# Patient Record
Sex: FEMALE | Race: WHITE | NOT HISPANIC OR LATINO | URBAN - METROPOLITAN AREA
[De-identification: names, ages, dates, MRNs, and addresses within clinical notes are randomized per-mention and may not be internally consistent; named-entity substitution may affect disease eponyms.]

---

## 2017-02-24 ENCOUNTER — OFFICE VISIT - HEALTHEAST (OUTPATIENT)
Dept: FAMILY MEDICINE | Facility: CLINIC | Age: 32
End: 2017-02-24

## 2017-02-24 DIAGNOSIS — R05.9 COUGH: ICD-10-CM

## 2017-02-24 ASSESSMENT — MIFFLIN-ST. JEOR: SCORE: 1897.89

## 2017-03-27 ENCOUNTER — COMMUNICATION - HEALTHEAST (OUTPATIENT)
Dept: SCHEDULING | Facility: CLINIC | Age: 32
End: 2017-03-27

## 2017-10-05 ENCOUNTER — AMBULATORY - HEALTHEAST (OUTPATIENT)
Dept: NURSING | Facility: CLINIC | Age: 32
End: 2017-10-05

## 2017-10-05 DIAGNOSIS — Z23 NEED FOR IMMUNIZATION AGAINST INFLUENZA: ICD-10-CM

## 2018-01-25 ENCOUNTER — OFFICE VISIT - HEALTHEAST (OUTPATIENT)
Dept: FAMILY MEDICINE | Facility: CLINIC | Age: 33
End: 2018-01-25

## 2018-01-25 ENCOUNTER — HOSPITAL ENCOUNTER (OUTPATIENT)
Dept: CT IMAGING | Facility: CLINIC | Age: 33
Discharge: HOME | End: 2018-01-25
Attending: FAMILY MEDICINE

## 2018-01-25 ENCOUNTER — COMMUNICATION - HEALTHEAST (OUTPATIENT)
Dept: TELEHEALTH | Facility: CLINIC | Age: 33
End: 2018-01-25

## 2018-01-25 ENCOUNTER — COMMUNICATION - HEALTHEAST (OUTPATIENT)
Dept: FAMILY MEDICINE | Facility: CLINIC | Age: 33
End: 2018-01-25

## 2018-01-25 DIAGNOSIS — R10.9 ABDOMINAL PAIN: ICD-10-CM

## 2018-01-25 LAB
ALBUMIN UR-MCNC: NEGATIVE MG/DL
APPEARANCE UR: CLEAR
BACTERIA #/AREA URNS HPF: ABNORMAL HPF
BILIRUB UR QL STRIP: NEGATIVE
COLOR UR AUTO: YELLOW
GLUCOSE UR STRIP-MCNC: NEGATIVE MG/DL
HCG UR QL: NEGATIVE
HGB UR QL STRIP: NEGATIVE
KETONES UR STRIP-MCNC: NEGATIVE MG/DL
LEUKOCYTE ESTERASE UR QL STRIP: ABNORMAL
NITRATE UR QL: NEGATIVE
PH UR STRIP: 6 [PH] (ref 5–8)
RBC #/AREA URNS AUTO: ABNORMAL HPF
SP GR UR STRIP: 1.02 (ref 1–1.03)
SP GR UR STRIP: 1.02 (ref 1–1.03)
SQUAMOUS #/AREA URNS AUTO: ABNORMAL LPF
UROBILINOGEN UR STRIP-ACNC: ABNORMAL
WBC #/AREA URNS AUTO: ABNORMAL HPF

## 2018-07-05 ENCOUNTER — RECORDS - HEALTHEAST (OUTPATIENT)
Dept: LAB | Facility: CLINIC | Age: 33
End: 2018-07-05

## 2018-07-07 LAB — BACTERIA SPEC CULT: NORMAL

## 2018-07-11 ENCOUNTER — COMMUNICATION - HEALTHEAST (OUTPATIENT)
Dept: ADMINISTRATIVE | Facility: CLINIC | Age: 33
End: 2018-07-11

## 2018-07-11 ENCOUNTER — OFFICE VISIT - HEALTHEAST (OUTPATIENT)
Dept: FAMILY MEDICINE | Facility: CLINIC | Age: 33
End: 2018-07-11

## 2018-07-11 DIAGNOSIS — H72.92 TYMPANIC MEMBRANE PERFORATION, LEFT: ICD-10-CM

## 2018-07-17 ENCOUNTER — OFFICE VISIT - HEALTHEAST (OUTPATIENT)
Dept: FAMILY MEDICINE | Facility: CLINIC | Age: 33
End: 2018-07-17

## 2018-07-17 DIAGNOSIS — H72.90 PERFORATED TYMPANIC MEMBRANE: ICD-10-CM

## 2018-10-03 ENCOUNTER — AMBULATORY - HEALTHEAST (OUTPATIENT)
Dept: NURSING | Facility: CLINIC | Age: 33
End: 2018-10-03

## 2018-11-30 ENCOUNTER — RECORDS - HEALTHEAST (OUTPATIENT)
Dept: ADMINISTRATIVE | Facility: OTHER | Age: 33
End: 2018-11-30

## 2019-01-18 ASSESSMENT — MIFFLIN-ST. JEOR: SCORE: 1831.2

## 2019-01-21 ENCOUNTER — ANESTHESIA - HEALTHEAST (OUTPATIENT)
Dept: SURGERY | Facility: HOSPITAL | Age: 34
End: 2019-01-21

## 2019-01-22 ENCOUNTER — SURGERY - HEALTHEAST (OUTPATIENT)
Dept: SURGERY | Facility: HOSPITAL | Age: 34
End: 2019-01-22

## 2020-02-03 ENCOUNTER — RECORDS - HEALTHEAST (OUTPATIENT)
Dept: ADMINISTRATIVE | Facility: OTHER | Age: 35
End: 2020-02-03

## 2021-01-24 ENCOUNTER — RECORDS - HEALTHEAST (OUTPATIENT)
Dept: ADMINISTRATIVE | Facility: OTHER | Age: 36
End: 2021-01-24

## 2021-02-11 ENCOUNTER — RECORDS - HEALTHEAST (OUTPATIENT)
Dept: ADMINISTRATIVE | Facility: OTHER | Age: 36
End: 2021-02-11

## 2021-05-30 VITALS — WEIGHT: 253.2 LBS | HEIGHT: 69 IN | BODY MASS INDEX: 37.5 KG/M2

## 2021-05-31 VITALS — BODY MASS INDEX: 37.55 KG/M2 | WEIGHT: 254.25 LBS

## 2021-06-01 VITALS — WEIGHT: 250.3 LBS | BODY MASS INDEX: 36.96 KG/M2

## 2021-06-01 VITALS — BODY MASS INDEX: 36.93 KG/M2 | WEIGHT: 250.06 LBS

## 2021-06-02 ENCOUNTER — RECORDS - HEALTHEAST (OUTPATIENT)
Dept: ADMINISTRATIVE | Facility: CLINIC | Age: 36
End: 2021-06-02

## 2021-06-02 VITALS — HEIGHT: 70 IN | WEIGHT: 235 LBS | BODY MASS INDEX: 33.64 KG/M2

## 2021-06-09 NOTE — PROGRESS NOTES
ASSESSMENT & PLAN:  1. Cough    To hold off on amoxicillin.  We'll switch her to Z-Robert.  Discussed compliance with medication.  Continue with the benzonatate, pro-air.  Discussed indications.  Recheck with PCP in one week if persistent or worse.  Fluid hydration, precautionary measures.  She was agreeable with the plans.       CHIEF COMPLAINT:  Chief Complaint   Patient presents with     Cough     First week in Feb. had pink eye ([facility]), next week seen online and diagnosed with a sinus infection and prescribed Amoxicllin 500 mg x7 days (only took for 2 days, due to stomach pain) and Benzonatate 100 mg PRN, pt now restarted Amoxcillin x2 days and symptoms getting worse.         HISTORY OF PRESENT ILLNESS:  Aspen is a 32 y.o. female presenting to the clinic today for evaluation of her cough and body aches. She is a patient of Dr. Coates. Her kids have had sinus infections, and she was prescribed amoxicillin for a sinus infection on [facility] a couple of weeks ago. She has not been using the benzonatate she was prescribed then because it was not very effective. She stopped taking amoxicillin after due days because it caused nausea and diarrhea. She was feeling better overall after she stopped the amoxicillin.   She then went to the [place] and she was feeling well until the last day of that week; she was coughing a lot at that time and it has been getting worse. She is coughing up phlegm and it has a yellow tint. She cannot speak in the mornings because her voice is gone. She started taking her amoxicillin two days ago, three times per day; she is not having stomach issues or diarrhea, but she is getting worse despite the antibiotic. She was not exposed to any specific known illness at [place], but she is sure she could have been. She had some chills, body aches, and she has been getting short of breath walking up a flight of stairs which is new for her. She has been taking ibuprofen for her pain  "and using an albuterol inhaler for wheezing and cough. The albuterol has been helpful for her wheezing but not for her cough. She has been trying to keep herself well-hydrated. She was wheezing and coughing last night and it would wake her up from sleep; she notes benzonatate and albuterol were helpful. She used the inhaler this morning before coming here. She has been taking an OTC probiotic. She denies any concerns for pregnancy; she had PCOS.     Recent Conjunctivitis: She was diagnosed with pink eye at the [facility] in the first week of February; she was prescribed eye drops and her symptoms cleared up.     REVIEW OF SYSTEMS:   She denies any fever, hemoptysis, nausea, vomiting, or history of lung disease or smoking. She has not taken any recent international trips. All other systems are negative.     PFSH:     TOBACCO USE:  History   Smoking Status     Never Smoker   Smokeless Tobacco     Never Used        VITALS:  Vitals:    02/24/17 1135   BP: 110/70   Patient Site: Left Arm   Patient Position: Sitting   Cuff Size: Adult Large   Pulse: 99   Temp: 98  F (36.7  C)   TempSrc: Oral   SpO2: 98%   Weight: (!) 253 lb 3.2 oz (114.9 kg)   Height: 5' 9\" (1.753 m)     Wt Readings from Last 3 Encounters:   02/24/17 (!) 253 lb 3.2 oz (114.9 kg)   11/05/16 (!) 259 lb 6.4 oz (117.7 kg)   11/01/16 (!) 256 lb 1.6 oz (116.2 kg)     Body mass index is 37.39 kg/(m^2).     PHYSICAL EXAM:  Visit Vitals     /70 (Patient Site: Left Arm, Patient Position: Sitting, Cuff Size: Adult Large)     Pulse 99     Temp 98  F (36.7  C) (Oral)  Comment: OTC Ibuprofen this morning.     Ht 5' 9\" (1.753 m)     Wt (!) 253 lb 3.2 oz (114.9 kg)     LMP 01/01/2017 (Approximate)  Comment: PCOS     SpO2 98%     Breastfeeding No     BMI 37.39 kg/m2       Vital signs noted above. AAO ×3.  HEENT negative.  Neck: Supple neck, nonpalpable cervical lymph nodes. Lungs: Clear to auscultation bilateral.  Heart: S1-S2 regular rate and rhythm, no murmurs " were noted.  Abdomen: with bowel sounds.  Extremities: pulses were full and equal.    DATA REVIEWED:  ADDITIONAL HISTORY SUMMARIZED (2): None.   DECISION TO OBTAIN EXTRA INFORMATION (1): GABO for [facility].   RADIOLOGY TESTS (1): None.  LABS (1): None.  MEDICINE TESTS (1): None.  INDEPENDENT REVIEW (2 each): None.     The visit lasted a total of 11 minutes face to face with the patient. Over 50% of the time was spent counseling and educating the patient about her cough and sinusitis.     IHilary, am scribing for and in the presence of Dr. Gibson.  IDr. Gibson, personally performed the services described in this documentation, as scribed by Hilary Ovalle in my presence, and it is both accurate and complete.     MEDICATIONS:  Current Outpatient Prescriptions   Medication Sig Dispense Refill     benzonatate (TESSALON PERLES) 100 MG capsule Take 1 capsule (100 mg total) by mouth 3 (three) times a day as needed for cough. 20 capsule 0     clonazePAM (KLONOPIN) 1 MG tablet Take one twice daily       escitalopram oxalate (LEXAPRO) 20 MG tablet TAKE 1 TABLET DAILY.  5     fluticasone (FLONASE) 50 mcg/actuation nasal spray 2 sprays into each nostril daily. 16 g 0     hydrOXYzine (ATARAX) 25 MG tablet as needed.       VENTOLIN HFA 90 mcg/actuation inhaler        azithromycin (ZITHROMAX Z-BHARGAVI) 250 MG tablet Take 2 tablets (500 mg) on  Day 1,  followed by 1 tablet (250 mg) once daily on Days 2 through 5. 6 tablet 0     escitalopram oxalate (LEXAPRO) 10 MG tablet Take one daily       No current facility-administered medications for this visit.         Total data points: 1

## 2021-06-15 NOTE — PROGRESS NOTES
ASSESSMENT/PLAN:  Abdominal pain  32-year-old female presents with a few days of left lower quadrant abdominal pain.  Differential diagnoses include diverticulitis versus ovarian.  Will obtain CT abdomen and pelvis.  We will get a urinalysis as well.  Further management will depend on results and her symptoms.  The patient verbalized understanding and agree with the plan  -     Urinalysis  -     CT Abdomen Pelvis Without Oral With Without IV Contrast; Future; Expected date: 1/25/18  -     Pregnancy (Beta-hCG, Qual), Urine    Orders Placed This Encounter   Procedures     CT Abdomen Pelvis Without Oral With Without IV Contrast     Standing Status:   Future     Standing Expiration Date:   1/25/2019     Order Specific Question:   Is the patient pregnant?     Answer:   No     Order Specific Question:   Can the procedure be changed per Radiologist protocol?     Answer:   Yes     Urinalysis     Pregnancy (Beta-hCG, Qual), Urine           CHIEF COMPLAINT:  Chief Complaint   Patient presents with     Abdominal Pain     x few days       HISTORY OF PRESENT ILLNESS:  Aspen is a 32 y.o. female presenting to the clinic today for abdominal pain. She has felt some lower abdominal pain for the past 4 days or so. She first felt the pain while walking around. She has felt the pain now more on the left lower abdomen. The pain has been intermittent, but she feels better when she lies down. The pain is rated at a 5-6/10, and a 7/10 with pressure on the area. She has felt similar pain in the past, but it has not lasted this long or been this intense. Her last period was 2 weeks ago. She did take a home pregnancy test, and it was negative. No abnormal bleeding. She is having some more discharge than normal. No vaginal itchiness or odor. She does note that her urine had more of an odor than usual. Bowel movements are more formed than normal; she just started a probiotic.     REVIEW OF SYSTEMS:   Constitutional: Negative.   HENT:  Negative.   Eyes: Negative.   Respiratory: Negative.   Cardiovascular: Negative.   Endocrine: Negative.   Musculoskeletal: Negative.   Skin: Negative.   Allergic/Immunologic: Negative.   Neurological: Negative.   Hematological: Negative.   Psychiatric/Behavioral: Negative.   All other systems are negative.    PFSH:  No new history.     TOBACCO USE:  History   Smoking Status     Never Smoker   Smokeless Tobacco     Never Used       VITALS:  Vitals:    01/25/18 1129   BP: 128/68   Pulse: 72   SpO2: 99%   Weight: (!) 254 lb 4 oz (115.3 kg)     Wt Readings from Last 3 Encounters:   01/25/18 (!) 254 lb 4 oz (115.3 kg)   02/24/17 (!) 253 lb 3.2 oz (114.9 kg)   11/05/16 (!) 259 lb 6.4 oz (117.7 kg)       PHYSICAL EXAM:  Constitutional: Patient is oriented to person, place, and time. Patient appears well-developed and well-nourished. No distress.   Abdominal: Soft. Bowel sounds are normal. Patient exhibits no distension and no mass.There is no rebound and no guarding. Tenderness to palpation to the left lower quadrant.   Skin: Skin is warm and dry. No rash noted. Patient is not diaphoretic. No erythema. No pallor.    Results for orders placed or performed in visit on 01/25/18   Urinalysis   Result Value Ref Range    Color, UA Yellow Colorless, Yellow, Straw, Light Yellow    Clarity, UA Clear Clear    Glucose, UA Negative Negative    Bilirubin, UA Negative Negative    Ketones, UA Negative Negative    Specific Gravity, UA 1.020 1.005 - 1.030    Blood, UA Negative Negative    pH, UA 6.0 5.0 - 8.0    Protein, UA Negative Negative mg/dL    Urobilinogen, UA 0.2 E.U./dL 0.2 E.U./dL, 1.0 E.U./dL    Nitrite, UA Negative Negative    Leukocytes, UA Trace (!) Negative    Bacteria, UA Few (!) None Seen hpf    RBC, UA None Seen None Seen, 0-2 hpf    WBC, UA 5-10 (!) None Seen, 0-5 hpf    Squam Epithel, UA 5-10 (!) None Seen, 0-5 lpf   Pregnancy (Beta-hCG, Qual), Urine   Result Value Ref Range    Pregnancy Test, Urine Negative Negative     Specific Gravity, UA 1.020 1.001 - 1.030           ADDITIONAL HISTORY SUMMARIZED (2): None.  DECISION TO OBTAIN EXTRA INFORMATION (1): None.   RADIOLOGY TESTS (1): Ordered abdominal CT.  LABS (1): Ordered labs today.  MEDICINE TESTS (1): None.  INDEPENDENT REVIEW (2 each): None.         I, Enriqueta Quintero, am scribing for and in the presence of, Dr. Coates.    ITee MD , personally performed the services described in this documentation, as scribed by Enriqueta Quintero in my presence, and it is both accurate and complete.    MEDICATIONS:  Current Outpatient Prescriptions   Medication Sig Dispense Refill     clonazePAM (KLONOPIN) 1 MG tablet Take one twice daily       escitalopram oxalate (LEXAPRO) 10 MG tablet Take one daily       escitalopram oxalate (LEXAPRO) 20 MG tablet TAKE 1 TABLET DAILY.  5     hydrOXYzine (ATARAX) 25 MG tablet as needed.       VENTOLIN HFA 90 mcg/actuation inhaler        No current facility-administered medications for this visit.        Total data points: 2

## 2021-06-19 NOTE — PROGRESS NOTES
Assessment:     1. Tympanic membrane perforation, left  Ambulatory referral to ENT          Plan:     Differential diagnosis include but not limited to right ear otalgia, right ear perforated tympanic membrane.  Discussed with the patient since this is not a matter of urgency, she was recently treated with antibiotics for otitis media and otitis externa.  At this time, I will refer her to ENT for further evaluation.  In the meantime patient is aware to return to clinic or go to emergency room if she experiences severe pain that is not resolved, decreased hearing, or drainage from the ear.  Patient verbalized understanding the plan of care.    Subjective:       33 y.o. female presents for evaluation of right ear pain and discomfort.  Patient reports that a little about 1 week ago she started experiencing ear pain, where she was treated with antibiotics drops.  The following day patient had severe ear pain, she was seen by another doctor in [place] she was given oral antibiotics.  She cannot remember the name of the medications.  She completed her oral antibiotics about 2 days ago, she reports that the symptoms are much better.  She gets jaimee pain but she feels like her right ear is plugged and every now and then she experiences sharp pain out of nowhere.  [facility], she was referred to her PCP or ENT.  Therefore patient is here to be evaluated.  She denies recent air travel, she denies recent swimming in the water.  She denies cold symptoms she denies nausea, vomiting, diarrhea, or fever.    The following portions of the patient's history were reviewed and updated as appropriate: allergies, current medications, past family history, past medical history, past social history, past surgical history and problem list.    Review of Systems  A 12 point comprehensive review of systems was negative except as noted.     Objective:      /86 (Patient Site: Right Arm, Patient Position:  Sitting, Cuff Size: Adult Large)  Pulse 90  Temp 98.1  F (36.7  C) (Oral)   Resp 18  Wt (!) 250 lb 4.8 oz (113.5 kg)  LMP 07/04/2018  SpO2 97%  BMI 36.96 kg/m2  General appearance: alert, appears stated age, cooperative and mild distress  Head: Normocephalic, without obvious abnormality, atraumatic, sinuses nontender to percussion  Eyes: conjunctivae/corneas clear. PERRL, EOM's intact. Fundi benign.  Ears: abnormal TM right ear - perforation: postero-superior 25% and abnormal TM left ear - bulging and air-fluid level  Nose: Nares normal. Septum midline. Mucosa normal. No drainage or sinus tenderness.  Throat: lips, mucosa, and tongue normal; teeth and gums normal  Lungs: clear to auscultation bilaterally  Heart: regular rate and rhythm, S1, S2 normal, no murmur, click, rub or gallop  Lymph nodes: Cervical, supraclavicular, and axillary nodes normal.  Neurologic: Grossly normal     This note has been dictated using voice recognition software. Any grammatical or context distortions are unintentional and inherent to the software

## 2021-06-19 NOTE — PROGRESS NOTES
ASSESSMENT/PLAN:  Perforated tympanic membrane, right ear  33-year-old female presented today for follow-up.  The patient had perforated right ear tympanic membrane from an otitis media and otitis externa.  She completed the antibiotic eardrops and oral antibiotics.  Symptoms are slightly better.  She still complains of plugged sensation in her ear and occasional mild shooting pain of both her ears.  Examination revealed still a swollen tympanic canal with perforation of the tympanic membrane.  I recommend that she continues to monitor her symptoms and that this is self-limiting.  She has an appointment with ENT in the next 2-1/2 weeks.    SUBJECTIVE:    Aspen Paniagua is a 33 y.o. female who came in today for follow up.  Patient was seen at a clinic in [place] for nontraumatic right ear pain for which she was diagnosed with otitis externa and given eardrops.  24 hours following that appointment she has severe pain was seen back at the clinic and given oral antibiotic and told to continue with the antibiotic ear drops.  She was also given an NSAID to take at that time.  A week following this incident she felt a plugged sensation in her ear which [facility]  At that appointment, she was told that she has a persistent ear infection and was referred to [facility].  The note for the [facility] was available for review and indicating a perforated eardrum.  She was referred to ENT for which she has an appointment on July 30, 2018 in the next 2-1/2 weeks.  At the present time, she still feels a plugged sensation in her right ear however this symptom is better than it had been the last couple weeks.  She still has some random mild jabbing or shooting pain of both ears.  Denies any fevers, chills, nausea, vomiting, sore throat, runny nose, cough, myalgia, malaise, vertigo.  She has noted some mild tinnitus however.  This is Q-tip occasionally and may have used  Q-tip prior to the start of the symptoms.  She does take baths but has not had any trauma, swimmy exposure, travel exposure.  No sick contact at home or anyone with similar symptoms.    Review of Systems (except those mentioned above)  Constitutional: Negative.   HENT: Negative.   Eyes: Negative.   Respiratory: Negative.   Cardiovascular: Negative.   Gastrointestinal: Negative.   Endocrine: Negative.   Genitourinary: Negative.   Musculoskeletal: Negative.   Skin: Negative.   Allergic/Immunologic: Negative.   Neurological: Negative.   Hematological: Negative.   Psychiatric/Behavioral: Negative.     Patient Active Problem List    Diagnosis Date Noted     Headache      Major Depression, Single Episode      Anxiety      Joint Pain, Localized In The Knee      No Known Allergies  Current Outpatient Prescriptions   Medication Sig Dispense Refill     busPIRone (BUSPAR) 5 MG tablet Take 5 mg by mouth 3 (three) times a day.       escitalopram oxalate (LEXAPRO) 20 MG tablet TAKE 1 TABLET DAILY.  5     hydrOXYzine (ATARAX) 25 MG tablet as needed.       No current facility-administered medications for this visit.      Past Medical History:   Diagnosis Date     Anxiety     Created by Conversion      Major Depression, Single Episode     Created by Conversion      No past surgical history on file.  Social History     Social History     Marital status:      Spouse name: N/A     Number of children: N/A     Years of education: N/A     Social History Main Topics     Smoking status: Never Smoker     Smokeless tobacco: Never Used     Alcohol use None     Drug use: None     Sexual activity: Not Asked     Other Topics Concern     None     Social History Narrative     No family history on file.      OBJECTIVE:    Vitals:    07/17/18 1547   BP: 104/70   Pulse: 68   Weight: (!) 250 lb 1 oz (113.4 kg)     Body mass index is 36.93 kg/(m^2).    Physical Exam:  Constitutional: Patient was oriented to person, place, and time. Patient  appeared well-developed and well-nourished. No distress.   Head: Normocephalic and atraumatic.   Right Ear: External ear normal.  Swollen ear canal with perforation evidence on tympanic membrane.  The tympanic membrane appeared to be somewhat dried up, and could be mistaken for cerumen.  Left Ear: External ear normal. Normal TM  Nose: Nose normal.   Mouth/Throat: Oropharynx was clear and moist. No oropharyngeal exudate.   Eyes: Conjunctivae and EOM were normal. Pupils were equal, round, and reactive to light. Right eye exhibited no discharge. Left eye exhibited no discharge. No scleral icterus.   Neck: Neck supple. No JVD present. No tracheal deviation present. No thyromegaly present.   Lymphadenopathy:  Patient has no cervical adenopathy.   Cardiovascular: Normal rate, regular rhythm, normal heart sounds and intact distal pulses. No murmur heard.   Pulmonary/Chest: Effort normal and breath sounds normal. No stridor. No respiratory distress. Patient had no wheezes, no rales, exhibits no tenderness.

## 2021-06-23 NOTE — ANESTHESIA PREPROCEDURE EVALUATION
Anesthesia Evaluation      Patient summary reviewed   History of anesthetic complications     Airway   Mallampati: II   Pulmonary - normal exam                          Cardiovascular - negative ROS and normal exam   Neuro/Psych    (+) anxiety/panic attacks,     Endo/Other    (+) obesity,      GI/Hepatic/Renal - negative ROS      Other findings: Hb 13.5, K 3.9  PONV      Dental - normal exam                        Anesthesia Plan  Planned anesthetic: general endotracheal    ASA 3   Induction: intravenous   Anesthetic plan and risks discussed with: patient    Post-op plan: routine recovery

## 2021-06-23 NOTE — ANESTHESIA CARE TRANSFER NOTE
Last vitals:   Vitals:    01/22/19 1018   BP: (!) 123/96   Pulse: (!) 104   Resp: 20   Temp: 36.4  C (97.6  F)   SpO2: 100%     Patient's level of consciousness is drowsy  Spontaneous respirations: yes  Maintains airway independently: yes  Dentition unchanged: yes  Oropharynx: oropharynx clear of all foreign objects    QCDR Measures:  ASA# 20 - Surgical Safety Checklist: WHO surgical safety checklist completed prior to induction    PQRS# 430 - Adult PONV Prevention: 4558F - Pt received => 2 anti-emetic agents (different classes) preop & intraop  ASA# 8 - Peds PONV Prevention: NA - Not pediatric patient, not GA or 2 or more risk factors NOT present  PQRS# 424 - Kanchan-op Temp Management: 4559F - At least one body temp DOCUMENTED => 35.5C or 95.9F within required timeframe  PQRS# 426 - PACU Transfer Protocol: - Transfer of care checklist used  ASA# 14 - Acute Post-op Pain: ASA14B - Patient did NOT experience pain >= 7 out of 10

## 2021-06-23 NOTE — ANESTHESIA POSTPROCEDURE EVALUATION
Patient: Aspen Paniagua  ROBOTIC ASSISTED TOTAL LAPAROSCOPIC HYSTERECTOMY BILATERAL SALPINGECTOMIES LEFT OOPHORECTOMY BILATERAL SENTINEL LYMPH NODE BIOPSIES  Anesthesia type: general    Patient location: Phase II Recovery  Last vitals:   Vitals:    01/22/19 1315   BP: 125/66   Pulse: 88   Resp:    Temp:    SpO2: 97%     Post vital signs: stable  Level of consciousness: awake and responds to simple questions  Post-anesthesia pain: pain controlled  Post-anesthesia nausea and vomiting: no  Pulmonary: unassisted, return to baseline  Cardiovascular: stable and blood pressure at baseline  Hydration: adequate  Anesthetic events: no    QCDR Measures:  ASA# 11 - Kanchan-op Cardiac Arrest: ASA11B - Patient did NOT experience unanticipated cardiac arrest  ASA# 12 - Kanchan-op Mortality Rate: ASA12B - Patient did NOT die  ASA# 13 - PACU Re-Intubation Rate: ASA13B - Patient did NOT require a new airway mgmt  ASA# 10 - Composite Anes Safety: ASA10A - No serious adverse event    Additional Notes: